# Patient Record
Sex: MALE | Race: WHITE | NOT HISPANIC OR LATINO | Employment: FULL TIME | URBAN - METROPOLITAN AREA
[De-identification: names, ages, dates, MRNs, and addresses within clinical notes are randomized per-mention and may not be internally consistent; named-entity substitution may affect disease eponyms.]

---

## 2023-07-24 ENCOUNTER — OFFICE VISIT (OUTPATIENT)
Dept: CARDIOLOGY | Facility: CLINIC | Age: 33
End: 2023-07-24
Payer: COMMERCIAL

## 2023-07-24 VITALS
OXYGEN SATURATION: 98 % | SYSTOLIC BLOOD PRESSURE: 131 MMHG | WEIGHT: 215 LBS | DIASTOLIC BLOOD PRESSURE: 77 MMHG | HEART RATE: 58 BPM | BODY MASS INDEX: 27.59 KG/M2 | HEIGHT: 74 IN

## 2023-07-24 DIAGNOSIS — R01.1 HEART MURMUR: Primary | ICD-10-CM

## 2023-07-24 DIAGNOSIS — R00.2 PALPITATIONS: ICD-10-CM

## 2023-07-24 PROCEDURE — 93000 ELECTROCARDIOGRAM COMPLETE: CPT | Performed by: INTERNAL MEDICINE

## 2023-07-24 PROCEDURE — 99203 OFFICE O/P NEW LOW 30 MIN: CPT | Performed by: INTERNAL MEDICINE

## 2023-07-24 RX ORDER — QUETIAPINE FUMARATE 50 MG/1
50 TABLET, FILM COATED ORAL
COMMUNITY
Start: 2023-05-15

## 2023-07-24 RX ORDER — BUPRENORPHINE AND NALOXONE 8; 2 MG/1; MG/1
1 FILM, SOLUBLE BUCCAL; SUBLINGUAL DAILY
COMMUNITY
Start: 2023-07-14

## 2023-07-24 NOTE — PROGRESS NOTES
"    Subjective:     Encounter Date:07/24/2023      Patient ID: Brett Downey is a 33 y.o. male.    Chief Complaint:  History of Present Illness 33-year-old white male with no significant past medical history presented to my office for new consultation.  Patient does not have any history of hypertension diabetes but does not know about his cholesterol at this time.  He does not smoke tobacco.  No complaints of any chest pains or shortness of breath at rest or exertion.  No PND orthopnea.  He has occasional palpitations without any dizziness syncope or swelling of the feet.  He takes some allergy medicines only.  /77   Pulse 58   Ht 188 cm (74.02\")   Wt 97.5 kg (215 lb)   SpO2 98%   BMI 27.59 kg/m²     The following portions of the patient's history were reviewed and updated as appropriate: allergies, current medications, past family history, past medical history, past social history, past surgical history, and problem list.  History reviewed. No pertinent past medical history.  History reviewed. No pertinent surgical history.  Social History     Socioeconomic History    Marital status: Single   Tobacco Use    Smoking status: Never     Passive exposure: Never    Smokeless tobacco: Current     Types: Chew   Vaping Use    Vaping Use: Never used   Substance and Sexual Activity    Alcohol use: No    Drug use: No    Sexual activity: Yes     Partners: Female     Comment: Had a substance abuse problem years ago. Have been clean     Family History   Problem Relation Age of Onset    No Known Problems Mother     Heart failure Father         Passed away from advanced cardio myopothy    No Known Problems Sister     No Known Problems Brother        Current Outpatient Medications:     buprenorphine-naloxone (SUBOXONE) 8-2 MG film film, Place 1 film under the tongue Daily., Disp: , Rfl:     methylPREDNISolone (MEDROL, LEEANNA,) 4 MG tablet, Take as directed on package instructions., Disp: 21 each, Rfl: 0    QUEtiapine " (SEROquel) 50 MG tablet, Take 1 tablet by mouth every night at bedtime., Disp: , Rfl:   No Known Allergies    Review of Systems   Constitutional: Negative for malaise/fatigue.   Cardiovascular:  Positive for palpitations. Negative for chest pain, dyspnea on exertion and leg swelling.   Respiratory:  Negative for cough and shortness of breath.    Gastrointestinal:  Negative for abdominal pain, nausea and vomiting.   Neurological:  Negative for dizziness, focal weakness, headaches, light-headedness and numbness.   All other systems reviewed and are negative.           Objective:     Constitutional:       Appearance: Well-developed.   Eyes:      General: No scleral icterus.     Conjunctiva/sclera: Conjunctivae normal.   HENT:      Head: Normocephalic and atraumatic.   Neck:      Vascular: No carotid bruit or JVD.   Pulmonary:      Effort: Pulmonary effort is normal.      Breath sounds: Normal breath sounds. No wheezing. No rales.   Cardiovascular:      Normal rate. Regular rhythm.      Murmurs: There is a diastolic murmur.   Pulses:     Intact distal pulses.   Abdominal:      General: Bowel sounds are normal.      Palpations: Abdomen is soft.   Musculoskeletal:      Cervical back: Normal range of motion and neck supple. Skin:     General: Skin is warm and dry.      Findings: No rash.   Neurological:      Mental Status: Alert.         ECG 12 Lead    Date/Time: 7/24/2023 1:21 PM  Performed by: Darell Lambert MD  Authorized by: Darell Lambert MD   Comments: Sinus rhythm  Nonspecific intraventricular conduction  Borderline ECG  No previous ECGs available        Lab Review:       Assessment:          Diagnosis Plan   1. Heart murmur        2. Palpitations               Plan:       Patient has history of occasional palpitations especially when he is anxious and does not have any other symptoms.  Patient does not have any history of hypertension diabetes  Patient has family history with his dad having bicuspid aortic valve  with aortic insufficiency s/p valve surgery  Patient has a soft diastolic murmur and hence I will perform an echocardiogram for LV function valve abnormalities  No further cardiac work-up at this time